# Patient Record
Sex: MALE
[De-identification: names, ages, dates, MRNs, and addresses within clinical notes are randomized per-mention and may not be internally consistent; named-entity substitution may affect disease eponyms.]

---

## 2024-04-17 PROBLEM — Z00.00 ENCOUNTER FOR PREVENTIVE HEALTH EXAMINATION: Status: ACTIVE | Noted: 2024-04-17

## 2024-04-18 ENCOUNTER — APPOINTMENT (OUTPATIENT)
Dept: UROLOGY | Facility: CLINIC | Age: 19
End: 2024-04-18
Payer: MEDICAID

## 2024-04-18 VITALS
TEMPERATURE: 98 F | DIASTOLIC BLOOD PRESSURE: 73 MMHG | SYSTOLIC BLOOD PRESSURE: 108 MMHG | HEIGHT: 71 IN | HEART RATE: 67 BPM | WEIGHT: 170 LBS | BODY MASS INDEX: 23.8 KG/M2

## 2024-04-18 DIAGNOSIS — Z98.890 OTHER SPECIFIED POSTPROCEDURAL STATES: ICD-10-CM

## 2024-04-18 DIAGNOSIS — Z78.9 OTHER SPECIFIED HEALTH STATUS: ICD-10-CM

## 2024-04-18 PROCEDURE — 99204 OFFICE O/P NEW MOD 45 MIN: CPT

## 2024-04-18 NOTE — LETTER BODY
[Consult Letter:] : I had the pleasure of evaluating your patient, [unfilled]. [Please see my note below.] : Please see my note below.

## 2024-05-09 ENCOUNTER — APPOINTMENT (OUTPATIENT)
Dept: UROLOGY | Facility: CLINIC | Age: 19
End: 2024-05-09

## 2024-05-22 ENCOUNTER — APPOINTMENT (OUTPATIENT)
Dept: UROLOGY | Facility: CLINIC | Age: 19
End: 2024-05-22
Payer: COMMERCIAL

## 2024-05-22 VITALS
HEIGHT: 71 IN | OXYGEN SATURATION: 99 % | WEIGHT: 175 LBS | RESPIRATION RATE: 18 BRPM | HEART RATE: 61 BPM | SYSTOLIC BLOOD PRESSURE: 121 MMHG | BODY MASS INDEX: 24.5 KG/M2 | DIASTOLIC BLOOD PRESSURE: 69 MMHG

## 2024-05-22 DIAGNOSIS — N46.9 MALE INFERTILITY, UNSPECIFIED: ICD-10-CM

## 2024-05-22 DIAGNOSIS — N43.3 HYDROCELE, UNSPECIFIED: ICD-10-CM

## 2024-05-22 DIAGNOSIS — I86.1 SCROTAL VARICES: ICD-10-CM

## 2024-05-22 PROCEDURE — G2211 COMPLEX E/M VISIT ADD ON: CPT

## 2024-05-22 PROCEDURE — 99213 OFFICE O/P EST LOW 20 MIN: CPT

## 2024-05-24 PROBLEM — N46.9 MALE INFERTILITY: Status: ACTIVE | Noted: 2024-04-18

## 2024-05-24 PROBLEM — N43.3 HYDROCELE, LEFT: Status: ACTIVE | Noted: 2024-04-18

## 2024-05-24 PROBLEM — I86.1 RIGHT VARICOCELE: Status: ACTIVE | Noted: 2024-05-19

## 2024-05-24 PROBLEM — I86.1 LEFT VARICOCELE: Status: ACTIVE | Noted: 2024-05-19

## 2024-05-24 NOTE — ASSESSMENT
[FreeTextEntry1] : He has bilateral varicoceles, with reversal of flow, as well as bilateral hydroceles up to 40 cc.  His semen analysis demonstrates more than enough for IVF.  His hormone panel is within normal limits.  For now we are going to observe and if and when he is finished with IVF and the hydroceles are bothering him he may return to discuss it.  For now they will continue the process of IVF

## 2024-05-24 NOTE — HISTORY OF PRESENT ILLNESS
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  He was last seen 04/18/2024 and I have had numerous communications with his rabbinical advisor Rabbi Richards. They are currently going through IVF and presents today to review his hormone panel, 'post wash' semen analysis, and ultrasound.  Blood work from 5/13/2024: CBC within normal limits Chromosome test pending Testosterone 446 TSH within normal limits LH 7 FSH 3.7 Prolactin 11.5 Estradiol 15.5  Semen analysis from 5/21/2024: Post wash demonstrates 34 million concentration 14.96 total motility  Scrotal ultrasound, from 5/18/2024: Right testicle demonstrates a 3 mm varicocele with reversal of flow.  Hydrocele, 2.2 x 1.2 x 1.6 cm present.  Testicle measures 5 x 2.2 x 3.1 cm Left testicle demonstrates a 3 mm varicocele with reversal of flow.  Hydrocele measures 6.3 x 2.7 x 5 cm.  Testicle measures 4.4 x 2.5 x 2.8 cm

## 2024-05-24 NOTE — LETTER HEADER
[FreeTextEntry3] : Noe Arechiga MD [facility] [facility], [address] [place] [address]

## 2024-06-06 ENCOUNTER — APPOINTMENT (OUTPATIENT)
Dept: UROLOGY | Facility: CLINIC | Age: 19
End: 2024-06-06